# Patient Record
Sex: FEMALE | Race: WHITE | URBAN - METROPOLITAN AREA
[De-identification: names, ages, dates, MRNs, and addresses within clinical notes are randomized per-mention and may not be internally consistent; named-entity substitution may affect disease eponyms.]

---

## 2017-09-06 ENCOUNTER — OFFICE (OUTPATIENT)
Dept: URBAN - METROPOLITAN AREA CLINIC 64 | Facility: CLINIC | Age: 82
End: 2017-09-06

## 2017-09-06 VITALS — WEIGHT: 121 LBS | DIASTOLIC BLOOD PRESSURE: 60 MMHG | HEIGHT: 63 IN | SYSTOLIC BLOOD PRESSURE: 100 MMHG

## 2017-09-06 DIAGNOSIS — K59.00 CONSTIPATION, UNSPECIFIED: ICD-10-CM

## 2017-09-06 DIAGNOSIS — R93.3 ABNORMAL FINDINGS ON DIAGNOSTIC IMAGING OF OTHER PARTS OF DI: ICD-10-CM

## 2017-09-06 DIAGNOSIS — R11.2 NAUSEA WITH VOMITING, UNSPECIFIED: ICD-10-CM

## 2017-09-06 PROCEDURE — 99203 OFFICE O/P NEW LOW 30 MIN: CPT | Performed by: INTERNAL MEDICINE

## 2017-09-06 NOTE — SERVICEHPINOTES
86 yo WF with h/o nausea/vomiting x 3 weeks. Vomits a couple times per week. There doesn't seem to be any reason why she is throwing up. Has lost about 20 lbs in 3 months. There is no change in amount eating, although has been vomiting recently. NO abd pain. No blood in her stool. Has been constipated recently. Is a nursing home patient at [facility]. No family history of any cancers. Mother had TB. Does not appear to vomit with any specific relation to food. Does not really care to eat, does not feel full, just no urge to eat anything.